# Patient Record
Sex: MALE | Race: BLACK OR AFRICAN AMERICAN | NOT HISPANIC OR LATINO | Employment: FULL TIME | URBAN - METROPOLITAN AREA
[De-identification: names, ages, dates, MRNs, and addresses within clinical notes are randomized per-mention and may not be internally consistent; named-entity substitution may affect disease eponyms.]

---

## 2022-02-27 ENCOUNTER — HISTORICAL (OUTPATIENT)
Dept: ADMINISTRATIVE | Facility: HOSPITAL | Age: 22
End: 2022-02-27

## 2022-08-01 ENCOUNTER — HOSPITAL ENCOUNTER (EMERGENCY)
Facility: HOSPITAL | Age: 22
Discharge: HOME | End: 2022-08-02
Attending: EMERGENCY MEDICINE
Payer: MEDICAID

## 2022-08-01 VITALS
BODY MASS INDEX: 24.99 KG/M2 | TEMPERATURE: 100 F | RESPIRATION RATE: 20 BRPM | HEIGHT: 65 IN | HEART RATE: 100 BPM | SYSTOLIC BLOOD PRESSURE: 110 MMHG | WEIGHT: 150 LBS | OXYGEN SATURATION: 97 % | DIASTOLIC BLOOD PRESSURE: 72 MMHG

## 2022-08-01 DIAGNOSIS — J02.9 VIRAL PHARYNGITIS: ICD-10-CM

## 2022-08-01 DIAGNOSIS — J06.9 UPPER RESPIRATORY TRACT INFECTION DUE TO COVID-19 VIRUS: Primary | ICD-10-CM

## 2022-08-01 DIAGNOSIS — U07.1 UPPER RESPIRATORY TRACT INFECTION DUE TO COVID-19 VIRUS: Primary | ICD-10-CM

## 2022-08-01 LAB
SARS-COV-2 RDRP RESP QL NAA+PROBE: POSITIVE
STREP A PCR (OHS): NOT DETECTED

## 2022-08-01 PROCEDURE — 87635 SARS-COV-2 COVID-19 AMP PRB: CPT | Performed by: EMERGENCY MEDICINE

## 2022-08-01 PROCEDURE — 87631 RESP VIRUS 3-5 TARGETS: CPT | Performed by: EMERGENCY MEDICINE

## 2022-08-01 PROCEDURE — 99282 EMERGENCY DEPT VISIT SF MDM: CPT | Mod: 25

## 2022-08-01 NOTE — Clinical Note
"Bib Burrows" Maricruz was seen and treated in our emergency department on 8/1/2022.  He may return to work on 08/08/2022.  Must wear mask for 5 days following      If you have any questions or concerns, please don't hesitate to call.      Sarah Owen MD"

## 2022-08-02 NOTE — ED PROVIDER NOTES
Encounter Date: 8/1/2022       History     Chief Complaint   Patient presents with    Sore Throat     C/o sore throat x3 days. Admits to cough. Denies fever. Airway patent by self.      The history is provided by the patient.   URI  The primary symptoms include sore throat, cough, nausea and vomiting (once today). Primary symptoms do not include fever, abdominal pain or rash. The current episode started several days ago. This is a new problem.   The sore throat has been unchanged since its onset. The sore throat is not accompanied by drooling or stridor.   The cough is productive.   Symptoms associated with the illness include congestion. The illness is not associated with chills.     Review of patient's allergies indicates:  No Known Allergies  Past Medical History:   Diagnosis Date    Asthma      No past surgical history on file.  No family history on file.     Review of Systems   Constitutional: Negative for appetite change, chills and fever.   HENT: Positive for congestion and sore throat. Negative for drooling.    Eyes: Negative for pain and visual disturbance.   Respiratory: Positive for cough. Negative for shortness of breath and stridor.    Gastrointestinal: Positive for nausea and vomiting (once today). Negative for abdominal pain and diarrhea.   Genitourinary: Negative for dysuria and hematuria.   Skin: Negative for rash and wound.   Allergic/Immunologic: Negative for food allergies and immunocompromised state.       Physical Exam     Initial Vitals [08/01/22 2203]   BP Pulse Resp Temp SpO2   110/72 100 20 99.7 °F (37.6 °C) 97 %      MAP       --         Physical Exam    Nursing note and vitals reviewed.  Constitutional: He appears well-developed and well-nourished. He is not diaphoretic. No distress.   HENT:   Head: Normocephalic and atraumatic.   Right Ear: External ear normal.   Left Ear: External ear normal.   Nose: Mucosal edema present.   Mouth/Throat: Uvula is midline and mucous membranes are  normal. Posterior oropharyngeal erythema present. No oropharyngeal exudate, posterior oropharyngeal edema or tonsillar abscesses.   Eyes: Conjunctivae and EOM are normal. Pupils are equal, round, and reactive to light.   Neck: Neck supple.   Normal range of motion.  Cardiovascular: Normal rate, regular rhythm and normal heart sounds.   Pulmonary/Chest: Breath sounds normal. No respiratory distress. He has no wheezes.   Abdominal: He exhibits no distension.   Musculoskeletal:         General: Normal range of motion.      Cervical back: Normal range of motion and neck supple.     Lymphadenopathy:     He has no cervical adenopathy.   Neurological: He is alert and oriented to person, place, and time. He has normal strength. GCS score is 15. GCS eye subscore is 4. GCS verbal subscore is 5. GCS motor subscore is 6.   Skin: Skin is warm and dry.   Psychiatric: He has a normal mood and affect.         ED Course   Procedures  Labs Reviewed   SARS-COV-2 RNA AMPLIFICATION, QUAL - Abnormal; Notable for the following components:       Result Value    SARS COV-2 MOLECULAR Positive (*)     All other components within normal limits   STREP GROUP A BY PCR - Normal          Imaging Results    None          Medications - No data to display  Medical Decision Making:   Initial Assessment:   23 yo male with cough, congestion and sore throat. Well appearing with post-nasal drip  Clinical Tests:   Lab Tests: Ordered and Reviewed       <> Summary of Lab: covid positive  Strep negative   ED Management:  Discussed supportive care and recent CDC guidelines              ED Course as of 08/02/22 0107   Tue Aug 02, 2022   0056 ID NOW COVID-19, (RO)(!): Positive [KM]   0056 STREP A PCR ([facility]): Not Detected [KM]      ED Course User Index  [KM] Sarah Owen MD             Clinical Impression:   Final diagnoses:  [J02.9] Viral pharyngitis  [U07.1, J06.9] Upper respiratory tract infection due to COVID-19 virus (Primary)          ED Disposition  Condition    Discharge Stable        ED Prescriptions     None        Follow-up Information     Follow up With Specialties Details Why Contact Info    [facility] - Emergency Dept Emergency Medicine  As needed, If symptoms worsen [address] [facility] [id number] [phone]           Sarah Owen MD  08/02/22 0107

## 2024-01-01 ENCOUNTER — HOSPITAL ENCOUNTER (EMERGENCY)
Facility: HOSPITAL | Age: 24
Discharge: HOME | End: 2024-01-01
Attending: EMERGENCY MEDICINE
Payer: MEDICAID

## 2024-01-01 VITALS
DIASTOLIC BLOOD PRESSURE: 89 MMHG | SYSTOLIC BLOOD PRESSURE: 123 MMHG | BODY MASS INDEX: 26.28 KG/M2 | TEMPERATURE: 99 F | HEART RATE: 98 BPM | RESPIRATION RATE: 20 BRPM | WEIGHT: 157.75 LBS | HEIGHT: 65 IN | OXYGEN SATURATION: 99 %

## 2024-01-01 DIAGNOSIS — L60.0 INGROWN RIGHT GREATER TOENAIL: Primary | ICD-10-CM

## 2024-01-01 PROCEDURE — 11730 AVULSION NAIL PLATE SIMPLE 1: CPT

## 2024-01-01 PROCEDURE — 99283 EMERGENCY DEPT VISIT LOW MDM: CPT

## 2024-01-01 PROCEDURE — 25000003 PHARM REV CODE 250: Performed by: PHYSICIAN ASSISTANT

## 2024-01-01 RX ORDER — LIDOCAINE HYDROCHLORIDE 10 MG/ML
5 INJECTION, SOLUTION EPIDURAL; INFILTRATION; INTRACAUDAL; PERINEURAL
Status: COMPLETED | OUTPATIENT
Start: 2024-01-01 | End: 2024-01-01

## 2024-01-01 RX ORDER — CEPHALEXIN 500 MG/1
500 CAPSULE ORAL EVERY 6 HOURS
Qty: 28 CAPSULE | Refills: 0 | Status: SHIPPED | OUTPATIENT
Start: 2024-01-01 | End: 2024-01-08

## 2024-01-01 RX ORDER — IBUPROFEN 800 MG/1
800 TABLET ORAL 3 TIMES DAILY PRN
Qty: 30 TABLET | Refills: 0 | Status: SHIPPED | OUTPATIENT
Start: 2024-01-01

## 2024-01-01 RX ORDER — MUPIROCIN 20 MG/G
OINTMENT TOPICAL 2 TIMES DAILY
Qty: 30 G | Refills: 0 | Status: SHIPPED | OUTPATIENT
Start: 2024-01-01 | End: 2024-01-06

## 2024-01-01 RX ORDER — IBUPROFEN 400 MG/1
800 TABLET ORAL
Status: COMPLETED | OUTPATIENT
Start: 2024-01-01 | End: 2024-01-01

## 2024-01-01 RX ADMIN — IBUPROFEN 800 MG: 400 TABLET, FILM COATED ORAL at 07:01

## 2024-01-01 RX ADMIN — BACITRACIN ZINC, NEOMYCIN SULFATE , POLYMYXIN B SULFATE. 1 EACH: 400; 3.5; 5 OINTMENT TOPICAL at 07:01

## 2024-01-01 RX ADMIN — LIDOCAINE HYDROCHLORIDE 50 MG: 10 INJECTION, SOLUTION EPIDURAL; INFILTRATION; INTRACAUDAL; PERINEURAL at 06:01

## 2024-01-01 NOTE — ED PROVIDER NOTES
Encounter Date: 1/1/2024       History     Chief Complaint   Patient presents with    Toe Injury     C/o right great toe pain x1 wk ago. Toe swollen at time      23-year-old male presents to the emergency department with complaints of right great toe pain and swelling x 1 week.  He states a couple weeks ago he dropped something on his right great toe.  About a week ago patient states he noticed if he pressed above the left side of his toenail or if he hits that area on anything, it causes pain.  He also reports a couple of days of pus drainage from the corner of the nail however no drainage at this time.  He rates his pain 5/10.   He denies fever, chills, numbness.      The history is provided by the patient. No  was used.     Review of patient's allergies indicates:  No Known Allergies  Past Medical History:   Diagnosis Date    Asthma      No past surgical history on file.  No family history on file.     Review of Systems   Constitutional:  Negative for appetite change, chills and fever.   Respiratory:  Negative for shortness of breath.    Cardiovascular:  Negative for chest pain.   Gastrointestinal:  Negative for nausea and vomiting.   Musculoskeletal:  Negative for back pain.        Right great toe pain     Skin:  Negative for pallor, rash and wound.   Neurological:  Negative for headaches.       Physical Exam     Initial Vitals [01/01/24 1656]   BP Pulse Resp Temp SpO2   126/85 88 18 99 °F (37.2 °C) 99 %      MAP       --         Physical Exam    Nursing note and vitals reviewed.  Constitutional: He appears well-developed and well-nourished.   HENT:   Nose: Nose normal.   Mouth/Throat: Oropharynx is clear and moist.   Eyes: Conjunctivae are normal.   Neck: Neck supple.   Normal range of motion.  Cardiovascular:  Normal rate, regular rhythm, normal heart sounds and intact distal pulses.           Pulmonary/Chest: Breath sounds normal.   Abdominal: Abdomen is soft. Bowel sounds are normal.  There is no abdominal tenderness.   Musculoskeletal:         General: Normal range of motion.      Cervical back: Normal range of motion and neck supple.      Comments: Swelling and tenderness to lateral tip of right great toe.  Ingrown toenail     Lymphadenopathy:     He has no cervical adenopathy.   Neurological: He is alert. GCS score is 15. GCS eye subscore is 4. GCS verbal subscore is 5. GCS motor subscore is 6.   Skin: Skin is warm. Capillary refill takes less than 2 seconds.         ED Course   Nail Removal    Date/Time: 1/1/2024 6:40 PM    Performed by: Harriet Travis PA  Authorized by: Haseeb Osborn MD  Consent Done: Yes  Consent: Verbal consent obtained.  Risks and benefits: risks, benefits and alternatives were discussed  Consent given by: patient  Patient understanding: patient states understanding of the procedure being performed  Nail removal extremity: right great toe (ingrown toenial)  Anesthesia: nerve block    Anesthesia:  Local Anesthetic: lidocaine 1% without epinephrine    Patient sedated: no  Preparation: skin prepped with Betadine  Amount removed: 1/4  Dressing: antibiotic ointment, gauze roll and 4x4  Patient tolerance: Patient tolerated the procedure well with no immediate complications        Labs Reviewed - No data to display       Imaging Results    None          Medications   LIDOcaine (PF) 10 mg/ml (1%) injection 50 mg (50 mg Infiltration Given by Provider 1/1/24 1800)   neomycin-bacitracnZn-polymyxnB packet 1 each (1 each Topical (Top) Given 1/1/24 1912)   ibuprofen tablet 800 mg (800 mg Oral Given 1/1/24 1912)     Medical Decision Making  23-year-old male presents to the emergency department with complaints of right great toe pain and swelling x 1 week.  He states a couple weeks ago he dropped something on his right great toe.  About a week ago patient states he noticed if he pressed above the left side of his toenail or if he hits that area on anything, it causes pain.  He also  reports a couple of days of pus drainage from the corner of the nail however no drainage at this time.  He rates his pain 5/10.   He denies fever, chills, numbness.      Partial removal of right great toenail to remove portion that was ingrown.  Mupirocin ointment and bandage applied prior to discharge.  Prescribed Keflex, ibuprofen and mupirocin.  Discussed ways to prevent future ingrown toenails.  Recommended warm salt water soaks to foot.  Patient declined XR, stating it does not like anything is broken.      Risk  OTC drugs.  Prescription drug management.                                      Clinical Impression:  Final diagnoses:  [L60.0] Ingrown right greater toenail (Primary)          ED Disposition Condition    Discharge Stable          ED Prescriptions       Medication Sig Dispense Start Date End Date Auth. Provider    cephALEXin (KEFLEX) 500 MG capsule Take 1 capsule (500 mg total) by mouth every 6 (six) hours. for 7 days 28 capsule 1/1/2024 1/8/2024 Harriet Travis PA    ibuprofen (ADVIL,MOTRIN) 800 MG tablet Take 1 tablet (800 mg total) by mouth 3 (three) times daily as needed for Pain. 30 tablet 1/1/2024 -- Harriet Traivs PA    mupirocin (BACTROBAN) 2 % ointment Apply topically 2 (two) times daily. for 5 days 30 g 1/1/2024 1/6/2024 Harriet Travis PA          Follow-up Information       Follow up With Specialties Details Why Contact Info    [facility] - Emergency Dept Emergency Medicine  As needed, If symptoms worsen [address] [facility] [address] [phone]             Harriet Travis PA  01/02/24 1390

## 2024-01-02 NOTE — DISCHARGE INSTRUCTIONS
Soak foot in warm Epsom salt for 20-30 minute daily.  Clean with soap and water.  Do not use peroxide on wound.  Take antibiotics as prescribed with food and water until complete.  Change bandage 1-2 times daily until healed.  Follow-up With the primary care provider within 1-2 days.  Return if symptoms worsen.

## 2025-03-27 ENCOUNTER — OFFICE VISIT (OUTPATIENT)
Dept: URGENT CARE | Facility: CLINIC | Age: 25
End: 2025-03-27
Payer: MEDICAID

## 2025-03-27 VITALS
TEMPERATURE: 99 F | RESPIRATION RATE: 16 BRPM | HEIGHT: 63 IN | OXYGEN SATURATION: 99 % | DIASTOLIC BLOOD PRESSURE: 84 MMHG | SYSTOLIC BLOOD PRESSURE: 123 MMHG | HEART RATE: 82 BPM | BODY MASS INDEX: 30.83 KG/M2 | WEIGHT: 174 LBS

## 2025-03-27 DIAGNOSIS — L81.9 HYPERPIGMENTATION: ICD-10-CM

## 2025-03-27 DIAGNOSIS — L81.1 MELASMA: Primary | ICD-10-CM

## 2025-03-27 DIAGNOSIS — J06.9 UPPER RESPIRATORY TRACT INFECTION, UNSPECIFIED TYPE: ICD-10-CM

## 2025-03-27 PROCEDURE — 99214 OFFICE O/P EST MOD 30 MIN: CPT | Mod: PBBFAC

## 2025-03-27 PROCEDURE — 63600175 PHARM REV CODE 636 W HCPCS

## 2025-03-27 PROCEDURE — 99213 OFFICE O/P EST LOW 20 MIN: CPT | Mod: S$PBB,,,

## 2025-03-27 RX ORDER — DEXBROMPHENIRAMINE MALEATE, PHENYLEPHRINE HYDROCHLORIDE 2; 7.5 MG/1; MG/1
2-7.5 TABLET ORAL 4 TIMES DAILY PRN
Qty: 28 TABLET | Refills: 0 | Status: SHIPPED | OUTPATIENT
Start: 2025-03-27 | End: 2025-04-03

## 2025-03-27 RX ORDER — PROMETHAZINE HYDROCHLORIDE AND DEXTROMETHORPHAN HYDROBROMIDE 6.25; 15 MG/5ML; MG/5ML
5 SYRUP ORAL EVERY 4 HOURS PRN
Qty: 118 ML | Refills: 0 | Status: SHIPPED | OUTPATIENT
Start: 2025-03-27 | End: 2025-04-06

## 2025-03-27 RX ORDER — DEXAMETHASONE SODIUM PHOSPHATE 4 MG/ML
4 INJECTION, SOLUTION INTRA-ARTICULAR; INTRALESIONAL; INTRAMUSCULAR; INTRAVENOUS; SOFT TISSUE
Status: COMPLETED | OUTPATIENT
Start: 2025-03-27 | End: 2025-03-27

## 2025-03-27 RX ADMIN — DEXAMETHASONE SODIUM PHOSPHATE 4 MG: 4 INJECTION, SOLUTION INTRA-ARTICULAR; INTRALESIONAL; INTRAMUSCULAR; INTRAVENOUS; SOFT TISSUE at 06:03

## 2025-03-27 NOTE — PROGRESS NOTES
"Subjective:       Patient ID: Bib Alcantara is a 25 y.o. male.    Vitals:  height is 5' 2.5" (1.588 m) and weight is 78.9 kg (174 lb). His temperature is 99 °F (37.2 °C). His blood pressure is 123/84 and his pulse is 82. His respiration is 16 and oxygen saturation is 99%.     Chief Complaint: URI (Cough, congestion x 2-3 weeks.)    25-year-old male reports to the clinic with complaints of cough, congestion, and rhinorrhea that began about 3 weeks ago.  Patient states he also has an area of discoloration on his left lower leg that appeared 1 day.  Patient states that area is not painful to the touch, denies any erythema, edema or drainage.  Patient states that area has not spread from initial size.  Patient denies any itching.    All other systems are negative    Chart reviewed    Objective:   Physical Exam   Constitutional: He appears well-developed.  Non-toxic appearance. He does not appear ill. No distress.   HENT:   Head: Normocephalic and atraumatic.   Ears:   Right Ear: Hearing, tympanic membrane, external ear and ear canal normal.   Left Ear: Hearing, tympanic membrane, external ear and ear canal normal.   Nose: Mucosal edema and rhinorrhea present. No purulent discharge. Right sinus exhibits no maxillary sinus tenderness and no frontal sinus tenderness. Left sinus exhibits no maxillary sinus tenderness and no frontal sinus tenderness.   Mouth/Throat: Uvula is midline. Posterior oropharyngeal edema and posterior oropharyngeal erythema present.   Eyes: Right eye exhibits no discharge. Left eye exhibits no discharge. Extraocular movement intact   Neck: Neck supple. No neck rigidity present.   Cardiovascular: Regular rhythm.   Pulmonary/Chest: Effort normal and breath sounds normal. No respiratory distress. He has no wheezes. He has no rhonchi. He has no rales.   Lymphadenopathy:     He has no cervical adenopathy.   Neurological: He is alert.   Skin: Skin is warm, dry, not diaphoretic and rash.              " Comments: Reticulated hyperpigmentation present to lateral left lower leg, no edema or erythema present, skin intact, no discharge or drainage present   Psychiatric: His behavior is normal.   Nursing note and vitals reviewed.      Assessment:     1. Melasma    2. Upper respiratory tract infection, unspecified type    3. Hyperpigmentation            Plan:   We will refer patient to Dermatology for hyperpigmentation  Begin using azelaic acid as instruction for hyperpigmentation  We will give dexamethasone 4 mg injection at today's visit  Begin taking promethazine DM and Digna his as needed for coughing congestion  Discussed ER precautions with patient and he verbalizes understanding  Please read all discharge and educational materials carefully      Melasma  -     Ambulatory referral/consult to Dermatology  -     azelaic acid (FINACEA) 15 % Foam; Apply 1 Application topically 2 (two) times daily. for 14 days  Dispense: 50 g; Refill: 3    Upper respiratory tract infection, unspecified type  -     dexAMETHasone injection 4 mg  -     promethazine-dextromethorphan (PROMETHAZINE-DM) 6.25-15 mg/5 mL Syrp; Take 5 mLs by mouth every 4 (four) hours as needed (cough).  Dispense: 118 mL; Refill: 0  -     dexbrompheniramine-phenyleph (ALAHIST PE) 2-7.5 mg Tab; Take 2-7.5 mg by mouth 4 (four) times daily as needed (congestion).  Dispense: 28 tablet; Refill: 0    Hyperpigmentation        Please note: This chart was completed via voice to text dictation. It may contain typographical/word recognition errors. If there are any questions, please contact the provider for final clarification.